# Patient Record
Sex: MALE | Employment: STUDENT | URBAN - METROPOLITAN AREA
[De-identification: names, ages, dates, MRNs, and addresses within clinical notes are randomized per-mention and may not be internally consistent; named-entity substitution may affect disease eponyms.]

---

## 2021-07-09 ENCOUNTER — APPOINTMENT (OUTPATIENT)
Dept: GENERAL RADIOLOGY | Age: 31
End: 2021-07-09
Attending: NURSE PRACTITIONER
Payer: MEDICAID

## 2021-07-09 ENCOUNTER — HOSPITAL ENCOUNTER (OUTPATIENT)
Age: 31
Discharge: HOME | End: 2021-07-09
Payer: MEDICAID

## 2021-07-09 VITALS
DIASTOLIC BLOOD PRESSURE: 69 MMHG | RESPIRATION RATE: 18 BRPM | HEART RATE: 88 BPM | OXYGEN SATURATION: 96 % | TEMPERATURE: 99 F | SYSTOLIC BLOOD PRESSURE: 109 MMHG

## 2021-07-09 DIAGNOSIS — B34.9 VIRAL SYNDROME: Primary | ICD-10-CM

## 2021-07-09 LAB — SARS-COV-2 RNA RESP QL NAA+PROBE: NOT DETECTED

## 2021-07-09 PROCEDURE — 99203 OFFICE O/P NEW LOW 30 MIN: CPT

## 2021-07-09 PROCEDURE — 71046 X-RAY EXAM CHEST 2 VIEWS: CPT | Performed by: NURSE PRACTITIONER

## 2021-07-09 RX ORDER — CLOBAZAM 10 MG/1
5 TABLET ORAL 2 TIMES DAILY
COMMUNITY
Start: 2021-06-08

## 2021-07-09 RX ORDER — ERGOCALCIFEROL 1.25 MG/1
50000 CAPSULE ORAL WEEKLY
COMMUNITY
Start: 2021-06-04

## 2021-07-09 RX ORDER — LACOSAMIDE 50 MG
50 TABLET ORAL 2 TIMES DAILY
COMMUNITY
Start: 2021-06-08

## 2021-07-09 NOTE — ED INITIAL ASSESSMENT (HPI)
Patient is here for evaluation of a congested cough x 4 days. Mom has been giving him mucinex but would like to be checked also for COVID. He has not had COVID vaccines. She states she traveled back from [facility] on 6/28/21.

## 2021-07-09 NOTE — ED PROVIDER NOTES
Patient Seen in: [facility]      History   Patient presents with:  Cough/URI    Stated Complaint: COVID TEST/COUGH X 4 DAYS    HPI/Subjective:   HPI  78-year-old male with history of autism, diabetes, and seizures presents to immediate ca sounds: Normal heart sounds. Pulmonary:      Effort: Pulmonary effort is normal. No respiratory distress. Breath sounds: Normal breath sounds. No wheezing. Lymphadenopathy:      Cervical: No cervical adenopathy.    Skin:     General: Skin is warm a

## 2022-04-05 ENCOUNTER — HOSPITAL ENCOUNTER (OUTPATIENT)
Age: 32
Discharge: HOME | End: 2022-04-05
Payer: MEDICAID

## 2022-04-05 VITALS — TEMPERATURE: 98 F | OXYGEN SATURATION: 98 % | RESPIRATION RATE: 18 BRPM | HEART RATE: 78 BPM

## 2022-04-05 DIAGNOSIS — Z20.822 LAB TEST NEGATIVE FOR COVID-19 VIRUS: Primary | ICD-10-CM

## 2022-04-05 LAB — SARS-COV-2 RNA RESP QL NAA+PROBE: NOT DETECTED

## 2022-04-05 PROCEDURE — 99212 OFFICE O/P EST SF 10 MIN: CPT

## 2022-04-05 NOTE — ED INITIAL ASSESSMENT (HPI)
Patient here for covid test, per mother patient does not have any symptoms, but requires covid test before an upcoming appt this week. Patient is not vaccinated.

## 2022-04-14 ENCOUNTER — APPOINTMENT (OUTPATIENT)
Dept: GENERAL RADIOLOGY | Age: 32
End: 2022-04-14
Attending: NURSE PRACTITIONER
Payer: MEDICAID

## 2022-04-14 ENCOUNTER — HOSPITAL ENCOUNTER (OUTPATIENT)
Age: 32
Discharge: HOME | End: 2022-04-14
Payer: MEDICAID

## 2022-04-14 VITALS
HEIGHT: 69 IN | RESPIRATION RATE: 18 BRPM | HEART RATE: 78 BPM | SYSTOLIC BLOOD PRESSURE: 103 MMHG | DIASTOLIC BLOOD PRESSURE: 70 MMHG | OXYGEN SATURATION: 95 % | TEMPERATURE: 98 F | BODY MASS INDEX: 26.66 KG/M2 | WEIGHT: 180 LBS

## 2022-04-14 DIAGNOSIS — S93.401A SPRAIN OF RIGHT ANKLE, UNSPECIFIED LIGAMENT, INITIAL ENCOUNTER: Primary | ICD-10-CM

## 2022-04-14 PROCEDURE — 73590 X-RAY EXAM OF LOWER LEG: CPT | Performed by: NURSE PRACTITIONER

## 2022-04-14 PROCEDURE — 99214 OFFICE O/P EST MOD 30 MIN: CPT

## 2022-04-14 PROCEDURE — 73560 X-RAY EXAM OF KNEE 1 OR 2: CPT | Performed by: NURSE PRACTITIONER

## 2022-04-14 PROCEDURE — 99213 OFFICE O/P EST LOW 20 MIN: CPT

## 2022-04-14 PROCEDURE — 73610 X-RAY EXAM OF ANKLE: CPT | Performed by: NURSE PRACTITIONER

## 2022-04-14 PROCEDURE — 73502 X-RAY EXAM HIP UNI 2-3 VIEWS: CPT | Performed by: NURSE PRACTITIONER

## 2022-04-14 RX ORDER — ACETAMINOPHEN 500 MG
1000 TABLET ORAL ONCE
Status: COMPLETED | OUTPATIENT
Start: 2022-04-14 | End: 2022-04-14

## 2022-04-14 NOTE — ED INITIAL ASSESSMENT (HPI)
Pt Mom concerned because Pt would not bear weight on foot to walk to the bathroom due to pain. She thinks it may be the right foot. Mom rpt no trauma occurred.

## 2022-08-25 ENCOUNTER — HOSPITAL ENCOUNTER (OUTPATIENT)
Age: 32
Discharge: HOME | End: 2022-08-25
Payer: MEDICAID

## 2022-08-25 VITALS
SYSTOLIC BLOOD PRESSURE: 104 MMHG | HEART RATE: 60 BPM | TEMPERATURE: 98 F | RESPIRATION RATE: 17 BRPM | BODY MASS INDEX: 28.04 KG/M2 | HEIGHT: 72 IN | WEIGHT: 207 LBS | DIASTOLIC BLOOD PRESSURE: 69 MMHG | OXYGEN SATURATION: 97 %

## 2022-08-25 DIAGNOSIS — U07.1 COVID-19: Primary | ICD-10-CM

## 2022-08-25 LAB — SARS-COV-2 RNA RESP QL NAA+PROBE: DETECTED

## 2022-08-25 PROCEDURE — 99213 OFFICE O/P EST LOW 20 MIN: CPT

## 2022-08-25 PROCEDURE — 99212 OFFICE O/P EST SF 10 MIN: CPT

## 2022-08-25 NOTE — ED INITIAL ASSESSMENT (HPI)
Patient presents to [facility] for covid test as he had exposure on Friday. +Headache on Saturday. No fever. NOT vaxxed or boosted.

## 2023-11-27 ENCOUNTER — HOSPITAL ENCOUNTER (OUTPATIENT)
Age: 33
Discharge: HOME | End: 2023-11-27
Attending: EMERGENCY MEDICINE
Payer: MEDICAID

## 2023-11-27 VITALS
SYSTOLIC BLOOD PRESSURE: 114 MMHG | TEMPERATURE: 98 F | HEART RATE: 82 BPM | DIASTOLIC BLOOD PRESSURE: 69 MMHG | RESPIRATION RATE: 17 BRPM | OXYGEN SATURATION: 97 %

## 2023-11-27 DIAGNOSIS — H10.32 ACUTE BACTERIAL CONJUNCTIVITIS OF LEFT EYE: Primary | ICD-10-CM

## 2023-11-27 PROCEDURE — 99213 OFFICE O/P EST LOW 20 MIN: CPT

## 2023-11-27 RX ORDER — ERYTHROMYCIN 5 MG/G
1 OINTMENT OPHTHALMIC 2 TIMES DAILY
Qty: 3.5 G | Refills: 0 | Status: SHIPPED | OUTPATIENT
Start: 2023-11-27 | End: 2023-12-04

## 2024-02-09 ENCOUNTER — HOSPITAL ENCOUNTER (OUTPATIENT)
Age: 34
Discharge: HOME | End: 2024-02-09
Attending: EMERGENCY MEDICINE
Payer: MEDICAID

## 2024-02-09 VITALS
SYSTOLIC BLOOD PRESSURE: 130 MMHG | OXYGEN SATURATION: 97 % | TEMPERATURE: 98 F | HEART RATE: 83 BPM | DIASTOLIC BLOOD PRESSURE: 76 MMHG | RESPIRATION RATE: 20 BRPM

## 2024-02-09 DIAGNOSIS — H10.31 ACUTE CONJUNCTIVITIS OF RIGHT EYE, UNSPECIFIED ACUTE CONJUNCTIVITIS TYPE: Primary | ICD-10-CM

## 2024-02-09 LAB — S PYO AG THROAT QL IA.RAPID: NEGATIVE

## 2024-02-09 PROCEDURE — 99214 OFFICE O/P EST MOD 30 MIN: CPT

## 2024-02-09 PROCEDURE — 87651 STREP A DNA AMP PROBE: CPT | Performed by: PHYSICIAN ASSISTANT

## 2024-02-09 PROCEDURE — 99213 OFFICE O/P EST LOW 20 MIN: CPT

## 2024-02-09 RX ORDER — POLYMYXIN B SULFATE AND TRIMETHOPRIM 1; 10000 MG/ML; [USP'U]/ML
1 SOLUTION OPHTHALMIC
Qty: 10 ML | Refills: 0 | Status: SHIPPED | OUTPATIENT
Start: 2024-02-09 | End: 2024-02-14

## 2024-02-09 NOTE — DISCHARGE INSTRUCTIONS
Rest    Frequent hand washing    See ophthalmologist if worsening or no improvement in 48-72 hours    Start antibiotics eye drops right away  Warm moist compresses to the eye to remove pustular drainage    Close follow-up with ophthalmology in the next 3-5 days if not better or sooner if worsening.

## 2024-02-09 NOTE — ED PROVIDER NOTES
Patient Seen in: [facility]      History     Chief Complaint   Patient presents with    Eye Problem     Stated Complaint: runny nose, eye issue    Subjective:   HPI    Joi is a pleasant 33-year-old male who comes in today with mom who is his guardian with a known medical history of autism, diabetes and seizure disorder.  Patient was sent home from school for right eye discharge and redness.  Patient also had some mild nasal congestion.  Otherwise no fevers or chills recorded.  Patient does not wear contacts or glasses.     Objective:   Past Medical History:   Diagnosis Date    Autism     Diabetes (HCC)     Seizure disorder (HCC)               History reviewed. No pertinent surgical history.             Social History     Socioeconomic History    Marital status: Single   Tobacco Use    Smoking status: Never    Smokeless tobacco: Never   Vaping Use    Vaping Use: Never used   Substance and Sexual Activity    Alcohol use: Never    Drug use: Never              Review of Systems    Positive for stated complaint: runny nose, eye issue  Other systems are as noted in HPI.  Constitutional and vital signs reviewed.      All other systems reviewed and negative except as noted above.    Physical Exam     ED Triage Vitals [02/09/24 1309]   /76   Pulse 83   Resp 20   Temp 98.3 °F (36.8 °C)   Temp src Temporal   SpO2 97 %   O2 Device None (Room air)       Current:/76   Pulse 83   Temp 98.3 °F (36.8 °C) (Temporal)   Resp 20   SpO2 97%         Physical Exam      General Appearance: Alert, cooperative, no SOB or labored breathing, appropriate for age   Head: Normocephalic, without obvious abnormality   Eyes: EOMI without nystagmus. PERRLA. Right eye: mild conjunctival injection with yellow crusting.  No obvious foreign body. No surrounding erythema or edema.       Ears: Bilateral:TM clear and pearly gray color. No external auditory canal edema or discharge. No pinna, tragus, or mastoid  TTP.  Nose: Nares symmetrical, No maxillary or frontal sinus tenderness   Throat: Lips, tongue, and mucosa are moist, pink, and intact; teeth intact. No erythema, edema, exudates of posterior pharynx. Uvula midline, palate symmetrical  Neck: Supple; no lymphadenopathy   Skin:  No rashes.        ED Course     Labs Reviewed   RAPID STREP A - Normal             [facility]          Medical Decision Making    Joi is a pleasant 33-year-old male who comes in today with mom who is his guardian with a known medical history of autism, diabetes and seizure disorder.  Patient was sent home from school for right eye discharge and redness.  Patient also had some mild nasal congestion.  Otherwise no fevers or chills recorded.  Patient does not wear contacts or glasses.  Does have a mild nasal congestion and mild sore throat    Problems Addressed:  Acute conjunctivitis of right eye, unspecified acute conjunctivitis type: acute illness or injury    Amount and/or Complexity of Data Reviewed  Labs: ordered. Decision-making details documented in ED Course.     Details: Strep neg    Risk  OTC drugs.  Prescription drug management.  Risk Details: Clinical diagnosis: Acute conjunctivitis    Differential diagnosis includes viral conjunctivitis, bacterial conjunctivitis, orbital cellulitis    Patient has no surrounding erythema, has purulent yellow drainage, will start on  antibiotic drops, warm compresses good handwashing, if no improvement be re-evaluated in 48 hours        Disposition and Plan     Clinical Impression:  1. Acute conjunctivitis of right eye, unspecified acute conjunctivitis type         Disposition:  Discharge  2/9/2024  1:28 pm    Follow-up:  Anish Huffman MD  [address] [place] [address] [phone]    Schedule an appointment as soon as possible for a visit   If symptoms worsen          Medications Prescribed:  Discharge Medication List as of 2/9/2024  1:34 PM        START taking these medications    Details    polymyxin B-trimethoprim 20339-7.1 UNIT/ML-% Ophthalmic Solution Apply 1 drop to eye Q3H While Awake for 5 days., Normal, Disp-10 mL, R-0